# Patient Record
Sex: FEMALE | Race: ASIAN | URBAN - METROPOLITAN AREA
[De-identification: names, ages, dates, MRNs, and addresses within clinical notes are randomized per-mention and may not be internally consistent; named-entity substitution may affect disease eponyms.]

---

## 2023-10-10 ENCOUNTER — TELEPHONE (OUTPATIENT)
Dept: FAMILY MEDICINE CLINIC | Facility: CLINIC | Age: 23
End: 2023-10-10

## 2023-10-10 NOTE — TELEPHONE ENCOUNTER
Reason for Call/Symptoms: CSS transfer call as pt is new pt and having a bump between her breast. Call was disconnected. I tried to call pt and had to leave her a message to call us back at [phone] option #2 to speak to a triage nurse.

## (undated) NOTE — LETTER
10/12/2023              [facility]         Dear Chantell Gallego,    This letter is to inform you that our office has made several attempts to reach you by phone without success. We were attempting to contact you by phone regarding your phone call to our office the other day. Please contact our office at the number listed below as soon as you receive this letter to discuss this issue and to make the necessary changes in our system to your contact information. Thank you for your cooperation.         Sincerely,    Bob Mazariegos, [address] [place] [address]  [phone]  Phone hours are :    Mon - Friday 8am - 4:30pm   Saturday 8am - 12pm    Document electronically generated by:  Sherman Muir